# Patient Record
Sex: MALE | Race: WHITE | Employment: FULL TIME | URBAN - METROPOLITAN AREA
[De-identification: names, ages, dates, MRNs, and addresses within clinical notes are randomized per-mention and may not be internally consistent; named-entity substitution may affect disease eponyms.]

---

## 2017-10-06 PROCEDURE — 88305 TISSUE EXAM BY PATHOLOGIST: CPT | Performed by: INTERNAL MEDICINE

## 2017-12-26 PROBLEM — R05.3 PERSISTENT COUGH: Status: ACTIVE | Noted: 2017-12-26

## 2018-03-04 PROBLEM — J32.0 CHRONIC MAXILLARY SINUSITIS: Status: ACTIVE | Noted: 2018-03-04

## 2018-07-17 PROBLEM — I10 ESSENTIAL HYPERTENSION: Status: ACTIVE | Noted: 2018-07-17

## 2018-07-17 PROBLEM — R05.3 CHRONIC COUGH: Status: ACTIVE | Noted: 2018-07-17

## 2018-11-15 RX ORDER — LOSARTAN POTASSIUM AND HYDROCHLOROTHIAZIDE 12.5; 1 MG/1; MG/1
1 TABLET ORAL DAILY
COMMUNITY
End: 2019-10-04

## 2018-11-21 ENCOUNTER — HOSPITAL ENCOUNTER (OUTPATIENT)
Facility: HOSPITAL | Age: 53
Setting detail: HOSPITAL OUTPATIENT SURGERY
Discharge: HOME | End: 2018-11-21
Attending: OTOLARYNGOLOGY | Admitting: OTOLARYNGOLOGY
Payer: COMMERCIAL

## 2018-11-21 ENCOUNTER — ANESTHESIA (OUTPATIENT)
Dept: SURGERY | Facility: HOSPITAL | Age: 53
End: 2018-11-21

## 2018-11-21 ENCOUNTER — ANESTHESIA EVENT (OUTPATIENT)
Dept: SURGERY | Facility: HOSPITAL | Age: 53
End: 2018-11-21

## 2018-11-21 VITALS
HEART RATE: 80 BPM | OXYGEN SATURATION: 97 % | TEMPERATURE: 98 F | HEIGHT: 68 IN | WEIGHT: 176.38 LBS | DIASTOLIC BLOOD PRESSURE: 89 MMHG | RESPIRATION RATE: 16 BRPM | BODY MASS INDEX: 26.73 KG/M2 | SYSTOLIC BLOOD PRESSURE: 131 MMHG

## 2018-11-21 DIAGNOSIS — J32.3 CHRONIC SPHENOIDAL SINUSITIS: ICD-10-CM

## 2018-11-21 DIAGNOSIS — J34.3 HYPERTROPHY OF NASAL TURBINATES: ICD-10-CM

## 2018-11-21 DIAGNOSIS — J34.2 DEVIATED NASAL SEPTUM: ICD-10-CM

## 2018-11-21 DIAGNOSIS — J32.2 CHRONIC ETHMOIDAL SINUSITIS: ICD-10-CM

## 2018-11-21 DIAGNOSIS — J32.0 CHRONIC MAXILLARY SINUSITIS: ICD-10-CM

## 2018-11-21 PROCEDURE — 09BT8ZZ EXCISION OF LEFT FRONTAL SINUS, VIA NATURAL OR ARTIFICIAL OPENING ENDOSCOPIC: ICD-10-PCS | Performed by: OTOLARYNGOLOGY

## 2018-11-21 PROCEDURE — 099X8ZZ DRAINAGE OF LEFT SPHENOID SINUS, VIA NATURAL OR ARTIFICIAL OPENING ENDOSCOPIC: ICD-10-PCS | Performed by: OTOLARYNGOLOGY

## 2018-11-21 PROCEDURE — 88311 DECALCIFY TISSUE: CPT | Performed by: OTOLARYNGOLOGY

## 2018-11-21 PROCEDURE — 09BS8ZZ EXCISION OF RIGHT FRONTAL SINUS, VIA NATURAL OR ARTIFICIAL OPENING ENDOSCOPIC: ICD-10-PCS | Performed by: OTOLARYNGOLOGY

## 2018-11-21 PROCEDURE — 099W8ZZ DRAINAGE OF RIGHT SPHENOID SINUS, VIA NATURAL OR ARTIFICIAL OPENING ENDOSCOPIC: ICD-10-PCS | Performed by: OTOLARYNGOLOGY

## 2018-11-21 PROCEDURE — 09BQ8ZZ EXCISION OF RIGHT MAXILLARY SINUS, VIA NATURAL OR ARTIFICIAL OPENING ENDOSCOPIC: ICD-10-PCS | Performed by: OTOLARYNGOLOGY

## 2018-11-21 PROCEDURE — 09BR8ZZ EXCISION OF LEFT MAXILLARY SINUS, VIA NATURAL OR ARTIFICIAL OPENING ENDOSCOPIC: ICD-10-PCS | Performed by: OTOLARYNGOLOGY

## 2018-11-21 PROCEDURE — 09TU8ZZ RESECTION OF RIGHT ETHMOID SINUS, VIA NATURAL OR ARTIFICIAL OPENING ENDOSCOPIC: ICD-10-PCS | Performed by: OTOLARYNGOLOGY

## 2018-11-21 PROCEDURE — 09TL8ZZ RESECTION OF NASAL TURBINATE, VIA NATURAL OR ARTIFICIAL OPENING ENDOSCOPIC: ICD-10-PCS | Performed by: OTOLARYNGOLOGY

## 2018-11-21 PROCEDURE — 09BM8ZZ EXCISION OF NASAL SEPTUM, VIA NATURAL OR ARTIFICIAL OPENING ENDOSCOPIC: ICD-10-PCS | Performed by: OTOLARYNGOLOGY

## 2018-11-21 PROCEDURE — 8E09XBZ COMPUTER ASSISTED PROCEDURE OF HEAD AND NECK REGION: ICD-10-PCS | Performed by: OTOLARYNGOLOGY

## 2018-11-21 PROCEDURE — 09TV8ZZ RESECTION OF LEFT ETHMOID SINUS, VIA NATURAL OR ARTIFICIAL OPENING ENDOSCOPIC: ICD-10-PCS | Performed by: OTOLARYNGOLOGY

## 2018-11-21 PROCEDURE — 88304 TISSUE EXAM BY PATHOLOGIST: CPT | Performed by: OTOLARYNGOLOGY

## 2018-11-21 RX ORDER — HYDROCODONE BITARTRATE AND ACETAMINOPHEN 5; 325 MG/1; MG/1
1 TABLET ORAL EVERY 4 HOURS PRN
Qty: 30 TABLET | Refills: 0 | Status: SHIPPED | OUTPATIENT
Start: 2018-11-21 | End: 2018-12-01

## 2018-11-21 RX ORDER — MORPHINE SULFATE 4 MG/ML
2 INJECTION, SOLUTION INTRAMUSCULAR; INTRAVENOUS EVERY 5 MIN PRN
Status: DISCONTINUED | OUTPATIENT
Start: 2018-11-21 | End: 2018-11-21

## 2018-11-21 RX ORDER — HYDROCODONE BITARTRATE AND ACETAMINOPHEN 5; 325 MG/1; MG/1
1 TABLET ORAL AS NEEDED
Status: DISCONTINUED | OUTPATIENT
Start: 2018-11-21 | End: 2018-11-21

## 2018-11-21 RX ORDER — MIDAZOLAM HYDROCHLORIDE 1 MG/ML
1 INJECTION INTRAMUSCULAR; INTRAVENOUS EVERY 5 MIN PRN
Status: DISCONTINUED | OUTPATIENT
Start: 2018-11-21 | End: 2018-11-21

## 2018-11-21 RX ORDER — CLINDAMYCIN PHOSPHATE 900 MG/50ML
900 INJECTION INTRAVENOUS ONCE
Status: COMPLETED | OUTPATIENT
Start: 2018-11-21 | End: 2018-11-21

## 2018-11-21 RX ORDER — HYDROCODONE BITARTRATE AND ACETAMINOPHEN 5; 325 MG/1; MG/1
2 TABLET ORAL AS NEEDED
Status: DISCONTINUED | OUTPATIENT
Start: 2018-11-21 | End: 2018-11-21

## 2018-11-21 RX ORDER — MEPERIDINE HYDROCHLORIDE 25 MG/ML
12.5 INJECTION INTRAMUSCULAR; INTRAVENOUS; SUBCUTANEOUS AS NEEDED
Status: DISCONTINUED | OUTPATIENT
Start: 2018-11-21 | End: 2018-11-21

## 2018-11-21 RX ORDER — ONDANSETRON 2 MG/ML
4 INJECTION INTRAMUSCULAR; INTRAVENOUS AS NEEDED
Status: DISCONTINUED | OUTPATIENT
Start: 2018-11-21 | End: 2018-11-21

## 2018-11-21 RX ORDER — LIDOCAINE HYDROCHLORIDE AND EPINEPHRINE 10; 10 MG/ML; UG/ML
INJECTION, SOLUTION INFILTRATION; PERINEURAL AS NEEDED
Status: DISCONTINUED | OUTPATIENT
Start: 2018-11-21 | End: 2018-11-21 | Stop reason: HOSPADM

## 2018-11-21 RX ORDER — DIPHENHYDRAMINE HYDROCHLORIDE 50 MG/ML
12.5 INJECTION INTRAMUSCULAR; INTRAVENOUS AS NEEDED
Status: DISCONTINUED | OUTPATIENT
Start: 2018-11-21 | End: 2018-11-21

## 2018-11-21 RX ORDER — NALOXONE HYDROCHLORIDE 0.4 MG/ML
80 INJECTION, SOLUTION INTRAMUSCULAR; INTRAVENOUS; SUBCUTANEOUS AS NEEDED
Status: DISCONTINUED | OUTPATIENT
Start: 2018-11-21 | End: 2018-11-21

## 2018-11-21 RX ORDER — SODIUM CHLORIDE, SODIUM LACTATE, POTASSIUM CHLORIDE, CALCIUM CHLORIDE 600; 310; 30; 20 MG/100ML; MG/100ML; MG/100ML; MG/100ML
INJECTION, SOLUTION INTRAVENOUS CONTINUOUS
Status: DISCONTINUED | OUTPATIENT
Start: 2018-11-21 | End: 2018-11-21

## 2018-11-21 RX ORDER — ACETAMINOPHEN 500 MG
1000 TABLET ORAL ONCE
Status: DISCONTINUED | OUTPATIENT
Start: 2018-11-21 | End: 2018-11-21

## 2018-11-21 RX ORDER — CLARITHROMYCIN 500 MG/1
500 TABLET, COATED ORAL 2 TIMES DAILY
Qty: 20 TABLET | Refills: 0 | Status: SHIPPED | OUTPATIENT
Start: 2018-11-21 | End: 2018-12-01

## 2018-11-21 NOTE — ANESTHESIA PREPROCEDURE EVALUATION
PRE-OP EVALUATION    Patient Name: Farhan Lambert    Pre-op Diagnosis: Chronic ethmoidal sinusitis [J32.2]  Chronic maxillary sinusitis [J32.0]  Chronic sphenoidal sinusitis [J32.3]  Deviated nasal septum [J34.2]  Hypertrophy of nasal turbinates [J34.3] 10/6/2017    Performed by Spencer Atkins MD at [address] [place] [address]   • [address]  2007    Had rectal bleeding, was okay. Had some internal hemorrhoids.     • ESWL LITHOTRIPSY WITH CYSTOSCOPY, STENT PLACEMENT Right 1/31/2013    Performe answered.     Plan/risks discussed with: patient and spouse                Present on Admission:  **None**

## 2018-11-21 NOTE — ANESTHESIA POSTPROCEDURE EVALUATION
[id number] [facility] Patient Status:  Hospital Outpatient Surgery   Age/Gender 52year old male MRN [id number]   Location [address] [facility] Attending Marya Last MD   Hosp Day # 0 PCP Isiah Banegas MD       Anesthesia Po

## 2018-11-21 NOTE — H&P
Pre-op Diagnosis: Chronic ethmoidal sinusitis [J32.2]  Chronic maxillary sinusitis [J32.0]  Chronic sphenoidal sinusitis [J32.3]  Deviated nasal septum [J34.2]  Hypertrophy of nasal turbinates [J34.3]    The H&P by Dr. Ancelmo Luna dated 11/9/18 was reviewed by

## 2018-12-15 NOTE — OPERATIVE REPORT
PATIENT NAME: Thuy Jenkins    MRN: [id number]     DATE OF OPERATION: 11/21/18    PREOPERATIVE DIAGNOSIS:    1. Chronic maxillary sinusitis   2. Chronic ethmoid sinusitis   3. Chronic sphenoid sinusitis   4. Chronic frontal sinusitis   5.  Deviated nasal s stealth image guidance was then set up and appropriately calibrated. Attention was turned to the septoplasty. Cocaine pledges were placed bilaterally. 1% lidocaine with 1/100,000 of epinephrine was then injected along the septum bilaterally.  A hemitra the left ethmoid bulla. A curette was then used to fracture the ethmoid bulla and through-cutting instruments were used to removed septa of the anterior ethmoids. A microdebrider was then used to remove tissue while preserving mucosa.    The basal lamella through-cutting instruments, the natural os was widened. The microdebrider was then used to remove soft tissue. Attention was turned to the right ethmoid bulla.   A curette was then used to fracture the ethmoid bulla and through-cutting instruments were microdebrider with 2mm inferior turbinate blade, a submucosal path was made from anterior to posterior on the left. Submucosa was then resected with the microdebrider. The same steps were performed on the right.  Finally, a Katy elevator was used to [facility]

## 2018-12-17 PROBLEM — S53.105S: Status: ACTIVE | Noted: 2018-12-17

## 2018-12-19 PROBLEM — M25.522 ELBOW PAIN, LEFT: Status: ACTIVE | Noted: 2018-12-19

## 2019-01-04 PROBLEM — S01.00XD: Status: ACTIVE | Noted: 2019-01-04

## 2019-02-17 PROBLEM — H81.13 BPPV (BENIGN PAROXYSMAL POSITIONAL VERTIGO), BILATERAL: Status: ACTIVE | Noted: 2019-02-17

## 2019-02-25 PROBLEM — M25.622 ELBOW STIFFNESS, LEFT: Status: ACTIVE | Noted: 2019-02-25

## 2019-03-05 PROBLEM — S53.105S: Status: ACTIVE | Noted: 2018-12-17

## 2019-10-04 PROBLEM — R05.3 PERSISTENT COUGH: Status: RESOLVED | Noted: 2017-12-26 | Resolved: 2019-10-04

## 2019-10-04 PROBLEM — M25.522 ELBOW PAIN, LEFT: Status: RESOLVED | Noted: 2018-12-19 | Resolved: 2019-10-04

## 2019-10-04 PROBLEM — R05.3 CHRONIC COUGH: Status: RESOLVED | Noted: 2018-07-17 | Resolved: 2019-10-04

## 2019-10-04 PROBLEM — S01.00XD: Status: RESOLVED | Noted: 2019-01-04 | Resolved: 2019-10-04

## 2019-10-04 PROBLEM — M25.622 ELBOW STIFFNESS, LEFT: Status: RESOLVED | Noted: 2019-02-25 | Resolved: 2019-10-04

## 2019-10-04 PROBLEM — J32.0 CHRONIC MAXILLARY SINUSITIS: Status: RESOLVED | Noted: 2018-03-04 | Resolved: 2019-10-04

## 2021-04-09 PROBLEM — N40.1 BENIGN LOCALIZED PROSTATIC HYPERPLASIA WITH LOWER URINARY TRACT SYMPTOMS (LUTS): Status: ACTIVE | Noted: 2021-04-09

## (undated) DEVICE — GAMMEX® PI HYBRID SIZE 7, STERILE POWDER-FREE SURGICAL GLOVE, POLYISOPRENE AND NEOPRENE BLEND: Brand: GAMMEX

## (undated) DEVICE — PATIENT TRACKER 9734887XOM NON-INVASIVE

## (undated) DEVICE — ARISTA 1 GRAM

## (undated) DEVICE — BLADE 1883519HR RAD90 3PK M4 3.5MM ROTAT: Brand: RAD

## (undated) DEVICE — KENDALL SCD EXPRESS SLEEVES, KNEE LENGTH, MEDIUM: Brand: KENDALL SCD

## (undated) DEVICE — INSTRUMENT TRACKER 9733533XOM ENT 1PK

## (undated) DEVICE — SINUS CDS: Brand: MEDLINE INDUSTRIES, INC.

## (undated) DEVICE — Device: Brand: NAKAO QUICKTRAP

## (undated) DEVICE — SUTURE PROLENE 2-0 CT-2

## (undated) DEVICE — BLADE 1882040 5PK INFERIOR TURB 2MM

## (undated) DEVICE — SOL  .9 1000ML BAG

## (undated) DEVICE — SUTURE CHROMIC GUT 4-0 P-3

## (undated) DEVICE — SOL  .9 1000ML BTL

## (undated) DEVICE — TUBING 1895522 5PK STRAIGHTSHOT TO XPS: Brand: STRAIGHTSHOT®

## (undated) DEVICE — ARISTA AH FLEXITIP XL APPLICATOR: Brand: ARISTA™ AH FLEXITIP™ XL

## (undated) DEVICE — SUTURE PLAIN GUT 4-0 SC-1

## (undated) DEVICE — BLADE 1884080EM TRICUT 4MMX13CM M4 ROHS: Brand: FUSION®